# Patient Record
Sex: FEMALE | Race: OTHER | HISPANIC OR LATINO | URBAN - METROPOLITAN AREA
[De-identification: names, ages, dates, MRNs, and addresses within clinical notes are randomized per-mention and may not be internally consistent; named-entity substitution may affect disease eponyms.]

---

## 2017-09-20 ENCOUNTER — EMERGENCY (EMERGENCY)
Facility: HOSPITAL | Age: 27
LOS: 1 days | Discharge: HOME | End: 2017-09-20
Attending: EMERGENCY MEDICINE
Payer: MEDICAID

## 2017-09-20 VITALS
RESPIRATION RATE: 16 BRPM | HEIGHT: 67.32 IN | HEART RATE: 76 BPM | TEMPERATURE: 98 F | WEIGHT: 149.91 LBS | SYSTOLIC BLOOD PRESSURE: 121 MMHG | OXYGEN SATURATION: 100 % | DIASTOLIC BLOOD PRESSURE: 73 MMHG

## 2017-09-20 DIAGNOSIS — G43.909 MIGRAINE, UNSPECIFIED, NOT INTRACTABLE, WITHOUT STATUS MIGRAINOSUS: ICD-10-CM

## 2017-09-20 LAB
ALBUMIN SERPL ELPH-MCNC: 2.9 G/DL — LOW (ref 3.5–5)
ALP SERPL-CCNC: 49 U/L — SIGNIFICANT CHANGE UP (ref 40–120)
ALT FLD-CCNC: 16 U/L DA — SIGNIFICANT CHANGE UP (ref 10–60)
ANION GAP SERPL CALC-SCNC: 5 MMOL/L — SIGNIFICANT CHANGE UP (ref 5–17)
APPEARANCE UR: CLEAR — SIGNIFICANT CHANGE UP
AST SERPL-CCNC: 12 U/L — SIGNIFICANT CHANGE UP (ref 10–40)
BACTERIA # UR AUTO: ABNORMAL /HPF
BASOPHILS # BLD AUTO: 0.1 K/UL — SIGNIFICANT CHANGE UP (ref 0–0.2)
BASOPHILS NFR BLD AUTO: 1.3 % — SIGNIFICANT CHANGE UP (ref 0–2)
BILIRUB SERPL-MCNC: 0.1 MG/DL — LOW (ref 0.2–1.2)
BILIRUB UR-MCNC: NEGATIVE — SIGNIFICANT CHANGE UP
BUN SERPL-MCNC: 6 MG/DL — LOW (ref 7–18)
CALCIUM SERPL-MCNC: 8.5 MG/DL — SIGNIFICANT CHANGE UP (ref 8.4–10.5)
CHLORIDE SERPL-SCNC: 111 MMOL/L — HIGH (ref 96–108)
CO2 SERPL-SCNC: 26 MMOL/L — SIGNIFICANT CHANGE UP (ref 22–31)
COLOR SPEC: YELLOW — SIGNIFICANT CHANGE UP
CREAT SERPL-MCNC: 0.61 MG/DL — SIGNIFICANT CHANGE UP (ref 0.5–1.3)
DIFF PNL FLD: NEGATIVE — SIGNIFICANT CHANGE UP
EOSINOPHIL # BLD AUTO: 0.4 K/UL — SIGNIFICANT CHANGE UP (ref 0–0.5)
EOSINOPHIL NFR BLD AUTO: 6.5 % — HIGH (ref 0–6)
GLUCOSE SERPL-MCNC: 81 MG/DL — SIGNIFICANT CHANGE UP (ref 70–99)
GLUCOSE UR QL: NEGATIVE — SIGNIFICANT CHANGE UP
HCG UR QL: NEGATIVE — SIGNIFICANT CHANGE UP
HCT VFR BLD CALC: 37.5 % — SIGNIFICANT CHANGE UP (ref 34.5–45)
HGB BLD-MCNC: 12.4 G/DL — SIGNIFICANT CHANGE UP (ref 11.5–15.5)
KETONES UR-MCNC: NEGATIVE — SIGNIFICANT CHANGE UP
LEUKOCYTE ESTERASE UR-ACNC: NEGATIVE — SIGNIFICANT CHANGE UP
LYMPHOCYTES # BLD AUTO: 2.9 K/UL — SIGNIFICANT CHANGE UP (ref 1–3.3)
LYMPHOCYTES # BLD AUTO: 41.4 % — SIGNIFICANT CHANGE UP (ref 13–44)
MCHC RBC-ENTMCNC: 32.9 PG — SIGNIFICANT CHANGE UP (ref 27–34)
MCHC RBC-ENTMCNC: 33 GM/DL — SIGNIFICANT CHANGE UP (ref 32–36)
MCV RBC AUTO: 99.6 FL — SIGNIFICANT CHANGE UP (ref 80–100)
MONOCYTES # BLD AUTO: 0.5 K/UL — SIGNIFICANT CHANGE UP (ref 0–0.9)
MONOCYTES NFR BLD AUTO: 7.8 % — SIGNIFICANT CHANGE UP (ref 2–14)
NEUTROPHILS # BLD AUTO: 3 K/UL — SIGNIFICANT CHANGE UP (ref 1.8–7.4)
NEUTROPHILS NFR BLD AUTO: 43 % — SIGNIFICANT CHANGE UP (ref 43–77)
NITRITE UR-MCNC: NEGATIVE — SIGNIFICANT CHANGE UP
PH UR: 8 — SIGNIFICANT CHANGE UP (ref 5–8)
PLATELET # BLD AUTO: 179 K/UL — SIGNIFICANT CHANGE UP (ref 150–400)
POTASSIUM SERPL-MCNC: 3.8 MMOL/L — SIGNIFICANT CHANGE UP (ref 3.5–5.3)
POTASSIUM SERPL-SCNC: 3.8 MMOL/L — SIGNIFICANT CHANGE UP (ref 3.5–5.3)
PROT SERPL-MCNC: 6.8 G/DL — SIGNIFICANT CHANGE UP (ref 6–8.3)
PROT UR-MCNC: NEGATIVE — SIGNIFICANT CHANGE UP
RBC # BLD: 3.76 M/UL — LOW (ref 3.8–5.2)
RBC # FLD: 11.6 % — SIGNIFICANT CHANGE UP (ref 10.3–14.5)
RBC CASTS # UR COMP ASSIST: SIGNIFICANT CHANGE UP /HPF (ref 0–2)
SODIUM SERPL-SCNC: 142 MMOL/L — SIGNIFICANT CHANGE UP (ref 135–145)
SP GR SPEC: 1.01 — SIGNIFICANT CHANGE UP (ref 1.01–1.02)
UROBILINOGEN FLD QL: NEGATIVE — SIGNIFICANT CHANGE UP
WBC # BLD: 6.9 K/UL — SIGNIFICANT CHANGE UP (ref 3.8–10.5)
WBC # FLD AUTO: 6.9 K/UL — SIGNIFICANT CHANGE UP (ref 3.8–10.5)
WBC UR QL: SIGNIFICANT CHANGE UP /HPF (ref 0–5)

## 2017-09-20 PROCEDURE — 81025 URINE PREGNANCY TEST: CPT

## 2017-09-20 PROCEDURE — 99284 EMERGENCY DEPT VISIT MOD MDM: CPT | Mod: 25

## 2017-09-20 PROCEDURE — 85027 COMPLETE CBC AUTOMATED: CPT

## 2017-09-20 PROCEDURE — 96375 TX/PRO/DX INJ NEW DRUG ADDON: CPT

## 2017-09-20 PROCEDURE — 81001 URINALYSIS AUTO W/SCOPE: CPT

## 2017-09-20 PROCEDURE — 80053 COMPREHEN METABOLIC PANEL: CPT

## 2017-09-20 PROCEDURE — 96374 THER/PROPH/DIAG INJ IV PUSH: CPT

## 2017-09-20 PROCEDURE — 99284 EMERGENCY DEPT VISIT MOD MDM: CPT

## 2017-09-20 RX ORDER — SODIUM CHLORIDE 9 MG/ML
1000 INJECTION INTRAMUSCULAR; INTRAVENOUS; SUBCUTANEOUS ONCE
Qty: 0 | Refills: 0 | Status: COMPLETED | OUTPATIENT
Start: 2017-09-20 | End: 2017-09-20

## 2017-09-20 RX ORDER — METOCLOPRAMIDE HCL 10 MG
10 TABLET ORAL ONCE
Qty: 0 | Refills: 0 | Status: COMPLETED | OUTPATIENT
Start: 2017-09-20 | End: 2017-09-20

## 2017-09-20 RX ORDER — ACETAMINOPHEN 500 MG
1000 TABLET ORAL ONCE
Qty: 0 | Refills: 0 | Status: COMPLETED | OUTPATIENT
Start: 2017-09-20 | End: 2017-09-20

## 2017-09-20 RX ADMIN — Medication 400 MILLIGRAM(S): at 17:48

## 2017-09-20 RX ADMIN — Medication 10 MILLIGRAM(S): at 17:40

## 2017-09-20 RX ADMIN — SODIUM CHLORIDE 2000 MILLILITER(S): 9 INJECTION INTRAMUSCULAR; INTRAVENOUS; SUBCUTANEOUS at 17:26

## 2017-09-20 NOTE — ED PROVIDER NOTE - PHYSICAL EXAMINATION
Constitutional: mild distress AAOx3  Eyes: PERRLA EOMI  Head: Normocephalic atraumatic  Mouth: MMM  Cardiac: regular rate   Resp: Lungs CTAB  GI: Abd s/nt/nd  Neuro: CN2-12 intact normal strength sensation and coordination normal gait  Skin: No rashes

## 2017-09-20 NOTE — ED PROVIDER NOTE - PROGRESS NOTE DETAILS
patient feeling better tolerating PO - Will d/c with follow up neuro. Minor Samuel M.D., Attending Physician

## 2017-09-20 NOTE — ED PROVIDER NOTE - NS ED ROS FT
Constitutional: No fever or chills  Eyes: No visual changes  HEENT: No throat pain  CV: No chest pain  Resp: No SOB no cough  GI: No abd pain, + nausea no vomiting  : No dysuria  MSK: No musculoskeletal pain  Skin: No rash  Neuro: + headache

## 2017-09-20 NOTE — ED PROVIDER NOTE - CARE PLAN
Principal Discharge DX:	Headache  Instructions for follow-up, activity and diet:	1. return for worsening symptoms or anything concerning to you  2. take all home meds as prescribed  3. follow up with your pmd call to make an appointment  4. follow up with neuro see atttached sheet.

## 2017-09-20 NOTE — ED PROVIDER NOTE - OBJECTIVE STATEMENT
27F presents to the ED with headache. Pt has a hx of migraines. this episode started 5 days ago. front constant gradually getting worse. associated with scotomas. nausea no vomiting. feels the same as previous headaches she has had. no recent travel skin rash neck pain or fever. no change in strength sensation or coordination. pain now mod-severe. no uri symptoms cp sob abd pain or dysuria. didn't take anything for her pain.

## 2017-09-20 NOTE — ED PROVIDER NOTE - MEDICAL DECISION MAKING DETAILS
27F presents to the ED with headache. Pt has a hx of migraines. this episode started 5 days ago. front constant gradually getting worse. associated with scotomas. nausea no vomiting. feels the same as previous headaches she has had. no recent travel skin rash neck pain or fever. no change in strength sensation or coordination. pain now mod-severe. no uri symptoms cp sob abd pain or dysuria. didn't take anything for her pain. Likely migraine. Will obtain labs for electrolytes, symptoms control urine/hcg and reassess. Minor Samuel M.D., Attending Physician

## 2017-09-20 NOTE — ED PROVIDER NOTE - PLAN OF CARE
1. return for worsening symptoms or anything concerning to you  2. take all home meds as prescribed  3. follow up with your pmd call to make an appointment  4. follow up with neuro see atttached sheet.

## 2017-11-20 ENCOUNTER — EMERGENCY (EMERGENCY)
Facility: HOSPITAL | Age: 27
LOS: 1 days | Discharge: HOME | End: 2017-11-20
Attending: EMERGENCY MEDICINE
Payer: MEDICAID

## 2017-11-20 VITALS
SYSTOLIC BLOOD PRESSURE: 107 MMHG | WEIGHT: 149.91 LBS | DIASTOLIC BLOOD PRESSURE: 73 MMHG | RESPIRATION RATE: 18 BRPM | HEART RATE: 85 BPM | TEMPERATURE: 99 F | OXYGEN SATURATION: 99 % | HEIGHT: 66 IN

## 2017-11-20 VITALS
DIASTOLIC BLOOD PRESSURE: 70 MMHG | TEMPERATURE: 98 F | HEART RATE: 81 BPM | SYSTOLIC BLOOD PRESSURE: 108 MMHG | OXYGEN SATURATION: 100 % | RESPIRATION RATE: 16 BRPM

## 2017-11-20 LAB
APPEARANCE UR: ABNORMAL
BACTERIA # UR AUTO: ABNORMAL /HPF
BILIRUB UR-MCNC: NEGATIVE — SIGNIFICANT CHANGE UP
COLOR SPEC: YELLOW — SIGNIFICANT CHANGE UP
DIFF PNL FLD: ABNORMAL
EPI CELLS # UR: SIGNIFICANT CHANGE UP /HPF
GLUCOSE UR QL: NEGATIVE — SIGNIFICANT CHANGE UP
HCG UR QL: NEGATIVE — SIGNIFICANT CHANGE UP
KETONES UR-MCNC: NEGATIVE — SIGNIFICANT CHANGE UP
LEUKOCYTE ESTERASE UR-ACNC: ABNORMAL
NITRITE UR-MCNC: POSITIVE
PH UR: 6 — SIGNIFICANT CHANGE UP (ref 5–8)
PROT UR-MCNC: 100
RBC CASTS # UR COMP ASSIST: >50 /HPF (ref 0–2)
SP GR SPEC: 1.01 — SIGNIFICANT CHANGE UP (ref 1.01–1.02)
UROBILINOGEN FLD QL: NEGATIVE — SIGNIFICANT CHANGE UP
WBC UR QL: ABNORMAL /HPF (ref 0–5)

## 2017-11-20 PROCEDURE — 99283 EMERGENCY DEPT VISIT LOW MDM: CPT

## 2017-11-20 PROCEDURE — 99284 EMERGENCY DEPT VISIT MOD MDM: CPT

## 2017-11-20 PROCEDURE — 81025 URINE PREGNANCY TEST: CPT

## 2017-11-20 PROCEDURE — 81001 URINALYSIS AUTO W/SCOPE: CPT

## 2017-11-20 RX ORDER — PHENAZOPYRIDINE HCL 100 MG
2 TABLET ORAL
Qty: 12 | Refills: 0 | OUTPATIENT
Start: 2017-11-20 | End: 2017-11-22

## 2017-11-20 RX ORDER — CEFUROXIME AXETIL 250 MG
250 TABLET ORAL ONCE
Qty: 0 | Refills: 0 | Status: COMPLETED | OUTPATIENT
Start: 2017-11-20 | End: 2017-11-20

## 2017-11-20 RX ORDER — CEFUROXIME AXETIL 250 MG
1 TABLET ORAL
Qty: 14 | Refills: 0 | OUTPATIENT
Start: 2017-11-20 | End: 2017-11-27

## 2017-11-20 RX ORDER — PHENAZOPYRIDINE HCL 100 MG
100 TABLET ORAL ONCE
Qty: 0 | Refills: 0 | Status: COMPLETED | OUTPATIENT
Start: 2017-11-20 | End: 2017-11-20

## 2017-11-20 RX ADMIN — Medication 100 MILLIGRAM(S): at 19:41

## 2017-11-20 RX ADMIN — Medication 250 MILLIGRAM(S): at 19:36

## 2017-11-20 NOTE — ED PROVIDER NOTE - OBJECTIVE STATEMENT
26 y/o F pt w/ PMHx of migraines, presents to ED c/o urinary frequency, burning w/ urination, and hematuria x last night after taking Plan-B one step. Pt denies fever, chills, or any other complaints. Pt is allergic to Ibuprofen (vomiting).

## 2017-11-20 NOTE — ED ADULT NURSE NOTE - OBJECTIVE STATEMENT
Patient came to the ED a/o x 3 ambulates c/o burning in urination, with hematuria x 2 days. . Patient states she took "plan B" medication and has been having symptoms since.

## 2017-11-24 DIAGNOSIS — Z88.6 ALLERGY STATUS TO ANALGESIC AGENT: ICD-10-CM

## 2017-11-24 DIAGNOSIS — N39.0 URINARY TRACT INFECTION, SITE NOT SPECIFIED: ICD-10-CM

## 2017-11-24 DIAGNOSIS — R30.0 DYSURIA: ICD-10-CM

## 2018-07-07 ENCOUNTER — EMERGENCY (EMERGENCY)
Facility: HOSPITAL | Age: 28
LOS: 1 days | Discharge: HOME | End: 2018-07-07
Attending: EMERGENCY MEDICINE
Payer: MEDICAID

## 2018-07-07 VITALS — HEIGHT: 68.5 IN | WEIGHT: 167.55 LBS

## 2018-07-07 LAB
ALBUMIN SERPL ELPH-MCNC: 3.1 G/DL — LOW (ref 3.5–5)
ALP SERPL-CCNC: 50 U/L — SIGNIFICANT CHANGE UP (ref 40–120)
ALT FLD-CCNC: 19 U/L DA — SIGNIFICANT CHANGE UP (ref 10–60)
ANION GAP SERPL CALC-SCNC: 6 MMOL/L — SIGNIFICANT CHANGE UP (ref 5–17)
APPEARANCE UR: CLEAR — SIGNIFICANT CHANGE UP
AST SERPL-CCNC: 16 U/L — SIGNIFICANT CHANGE UP (ref 10–40)
BASOPHILS # BLD AUTO: 0.1 K/UL — SIGNIFICANT CHANGE UP (ref 0–0.2)
BASOPHILS NFR BLD AUTO: 0.8 % — SIGNIFICANT CHANGE UP (ref 0–2)
BILIRUB SERPL-MCNC: 0.2 MG/DL — SIGNIFICANT CHANGE UP (ref 0.2–1.2)
BILIRUB UR-MCNC: NEGATIVE — SIGNIFICANT CHANGE UP
BUN SERPL-MCNC: 10 MG/DL — SIGNIFICANT CHANGE UP (ref 7–18)
CALCIUM SERPL-MCNC: 9.1 MG/DL — SIGNIFICANT CHANGE UP (ref 8.4–10.5)
CHLORIDE SERPL-SCNC: 107 MMOL/L — SIGNIFICANT CHANGE UP (ref 96–108)
CO2 SERPL-SCNC: 28 MMOL/L — SIGNIFICANT CHANGE UP (ref 22–31)
COLOR SPEC: YELLOW — SIGNIFICANT CHANGE UP
CREAT SERPL-MCNC: 0.72 MG/DL — SIGNIFICANT CHANGE UP (ref 0.5–1.3)
DIFF PNL FLD: ABNORMAL
EOSINOPHIL # BLD AUTO: 0.2 K/UL — SIGNIFICANT CHANGE UP (ref 0–0.5)
EOSINOPHIL NFR BLD AUTO: 1.9 % — SIGNIFICANT CHANGE UP (ref 0–6)
GLUCOSE SERPL-MCNC: 91 MG/DL — SIGNIFICANT CHANGE UP (ref 70–99)
GLUCOSE UR QL: NEGATIVE — SIGNIFICANT CHANGE UP
HCG UR QL: NEGATIVE — SIGNIFICANT CHANGE UP
HCT VFR BLD CALC: 37.8 % — SIGNIFICANT CHANGE UP (ref 34.5–45)
HGB BLD-MCNC: 12.4 G/DL — SIGNIFICANT CHANGE UP (ref 11.5–15.5)
KETONES UR-MCNC: NEGATIVE — SIGNIFICANT CHANGE UP
LEUKOCYTE ESTERASE UR-ACNC: ABNORMAL
LYMPHOCYTES # BLD AUTO: 2.4 K/UL — SIGNIFICANT CHANGE UP (ref 1–3.3)
LYMPHOCYTES # BLD AUTO: 22 % — SIGNIFICANT CHANGE UP (ref 13–44)
MCHC RBC-ENTMCNC: 31.7 PG — SIGNIFICANT CHANGE UP (ref 27–34)
MCHC RBC-ENTMCNC: 32.7 GM/DL — SIGNIFICANT CHANGE UP (ref 32–36)
MCV RBC AUTO: 96.8 FL — SIGNIFICANT CHANGE UP (ref 80–100)
MONOCYTES # BLD AUTO: 0.7 K/UL — SIGNIFICANT CHANGE UP (ref 0–0.9)
MONOCYTES NFR BLD AUTO: 6.3 % — SIGNIFICANT CHANGE UP (ref 2–14)
NEUTROPHILS # BLD AUTO: 7.5 K/UL — HIGH (ref 1.8–7.4)
NEUTROPHILS NFR BLD AUTO: 69.2 % — SIGNIFICANT CHANGE UP (ref 43–77)
NITRITE UR-MCNC: NEGATIVE — SIGNIFICANT CHANGE UP
PH UR: 6.5 — SIGNIFICANT CHANGE UP (ref 5–8)
PLATELET # BLD AUTO: 206 K/UL — SIGNIFICANT CHANGE UP (ref 150–400)
POTASSIUM SERPL-MCNC: 4.4 MMOL/L — SIGNIFICANT CHANGE UP (ref 3.5–5.3)
POTASSIUM SERPL-SCNC: 4.4 MMOL/L — SIGNIFICANT CHANGE UP (ref 3.5–5.3)
PROT SERPL-MCNC: 7.5 G/DL — SIGNIFICANT CHANGE UP (ref 6–8.3)
PROT UR-MCNC: NEGATIVE — SIGNIFICANT CHANGE UP
RBC # BLD: 3.91 M/UL — SIGNIFICANT CHANGE UP (ref 3.8–5.2)
RBC # FLD: 11.5 % — SIGNIFICANT CHANGE UP (ref 10.3–14.5)
SODIUM SERPL-SCNC: 141 MMOL/L — SIGNIFICANT CHANGE UP (ref 135–145)
SP GR SPEC: 1 — LOW (ref 1.01–1.02)
UROBILINOGEN FLD QL: NEGATIVE — SIGNIFICANT CHANGE UP
WBC # BLD: 10.9 K/UL — HIGH (ref 3.8–10.5)
WBC # FLD AUTO: 10.9 K/UL — HIGH (ref 3.8–10.5)

## 2018-07-07 PROCEDURE — 74177 CT ABD & PELVIS W/CONTRAST: CPT | Mod: 26

## 2018-07-07 PROCEDURE — 99285 EMERGENCY DEPT VISIT HI MDM: CPT

## 2018-07-07 RX ORDER — CEFTRIAXONE 500 MG/1
1 INJECTION, POWDER, FOR SOLUTION INTRAMUSCULAR; INTRAVENOUS ONCE
Qty: 0 | Refills: 0 | Status: COMPLETED | OUTPATIENT
Start: 2018-07-07 | End: 2018-07-07

## 2018-07-07 RX ORDER — SODIUM CHLORIDE 9 MG/ML
1000 INJECTION INTRAMUSCULAR; INTRAVENOUS; SUBCUTANEOUS ONCE
Qty: 0 | Refills: 0 | Status: COMPLETED | OUTPATIENT
Start: 2018-07-07 | End: 2018-07-07

## 2018-07-07 RX ORDER — CEFUROXIME AXETIL 250 MG
1 TABLET ORAL
Qty: 10 | Refills: 0 | OUTPATIENT
Start: 2018-07-07 | End: 2018-07-11

## 2018-07-07 RX ORDER — PHENAZOPYRIDINE HCL 100 MG
1 TABLET ORAL
Qty: 6 | Refills: 0 | OUTPATIENT
Start: 2018-07-07 | End: 2018-07-08

## 2018-07-07 RX ADMIN — SODIUM CHLORIDE 1000 MILLILITER(S): 9 INJECTION INTRAMUSCULAR; INTRAVENOUS; SUBCUTANEOUS at 19:58

## 2018-07-07 NOTE — ED PROVIDER NOTE - MEDICAL DECISION MAKING DETAILS
27 y/o F pt presents with lower abdominal pain and burning with urination. Will check labs, will check urine, and will reassess.

## 2018-07-07 NOTE — ED PROVIDER NOTE - OBJECTIVE STATEMENT
27 y/o F pt with PMHx of Migraines (on medication) and no significant PSHx presents with mother to ED c/o worsening lower abdominal pain with associated burning with urination x2 days. Pt additionally reports associated occasional urinary retention. Pt describes lower abdominal pain as pinching in nature. Pt denies fever, chills, nausea, vomiting, diarrhea, or any other complaints. Per pt's mother, pt recently had a dental infection, which was treated with Abx's BIB x20 days prescribed by pt's dentist. Allergies: Ibuprofen (vomiting).

## 2018-07-08 VITALS
OXYGEN SATURATION: 100 % | HEART RATE: 72 BPM | RESPIRATION RATE: 18 BRPM | SYSTOLIC BLOOD PRESSURE: 104 MMHG | DIASTOLIC BLOOD PRESSURE: 69 MMHG | TEMPERATURE: 98 F

## 2018-07-08 PROCEDURE — 74177 CT ABD & PELVIS W/CONTRAST: CPT

## 2018-07-08 PROCEDURE — 96374 THER/PROPH/DIAG INJ IV PUSH: CPT | Mod: XU

## 2018-07-08 PROCEDURE — 81001 URINALYSIS AUTO W/SCOPE: CPT

## 2018-07-08 PROCEDURE — 80053 COMPREHEN METABOLIC PANEL: CPT

## 2018-07-08 PROCEDURE — 85027 COMPLETE CBC AUTOMATED: CPT

## 2018-07-08 PROCEDURE — 87186 SC STD MICRODIL/AGAR DIL: CPT

## 2018-07-08 PROCEDURE — 87086 URINE CULTURE/COLONY COUNT: CPT

## 2018-07-08 PROCEDURE — 99284 EMERGENCY DEPT VISIT MOD MDM: CPT | Mod: 25

## 2018-07-08 PROCEDURE — 81025 URINE PREGNANCY TEST: CPT

## 2018-07-08 RX ADMIN — CEFTRIAXONE 100 GRAM(S): 500 INJECTION, POWDER, FOR SOLUTION INTRAMUSCULAR; INTRAVENOUS at 00:14

## 2019-03-04 NOTE — ED ADULT TRIAGE NOTE - MEANS OF ARRIVAL
[facility] Accession Number: [id number]

.                                                                01

Material submitted:                                        .

PART A: LEFT THYROID TISSUE R/O LEFT PARATHYROID - FS

PART B: LEFT THYROID AND ISTHMUS

PART C: RIGHT THYROID LOBE

.                                                                01

Clinical history:                                          .

Multi [place]

.                                                                02

**********************************************************************

Diagnosis:

A. Left thyroid tissue rule out parathyroid:

- Parathyroid tissue identified.

.

B. Thyroid lobe and isthmus, left thyroid lobectomy and isthmusectomy:

- Adenomatous nodules, multiple, the largest measuring 4.0 cm, showing

focal sclerosis and dystrophic calcification.

- Single left perithyroidal lymph node negative for tumor.

.

C. Thyroid lobe, right thyroid lobectomy:

- Adenomatous nodules, multiple, the largest measuring 1.3 cm in greatest

dimension.

- Single right perithyroidal lymph node negative for tumor.

- No parathyroid glands identified.

LBQ/03/04/2019

**********************************************************************

.                                                                02

Comment:

There is no evidence of malignancy.

(JPM/db; 3/04/2019)

.                                                                02

Electronically signed:                                     .

Fab Harmon MD, Pathologist

NPI- [id number]

.                                                                01

Gross description:                                         .

A.  Received fresh for intraoperative frozen section consultation,

labeled, "Elisabeth Molina - Left thyroid tissue, rule out left parathyroid",

is a small segment of yellow and reddish-brown focally cauterized soft

tissue measuring up to 0.5 cm in greatest dimension. This is submitted for

frozen section without sectioning as FSA1. The tissue remaining from

frozen section is submitted for permanent sections as A1.

(JPM:mml; 02/28/2019)

.

B. The specimen is received in formalin, labeled "TracyElisabeth, thyroid and

isthmus left" and consists of a 48 g enlarged left lobe of thyroid (6.2 x

5.0 x 3.5 cm) and isthmus (3.2 x 2.3 cm).  The capsule is purple-tan and

disrupted with friable nodule protruding from the disruption.  The

specimen is inked black with the disruption inked orange.  It is sectioned

S-I to reveal the lobe has been almost entirely replaced by multiple tan,

friable to solid colloid nodules with focal calcifications.  The nodules

occupy over 95% of the specimen with the less than 5% consisting of soft

brown-red parenchyma at the isthmus.  The nodules range from 0.1 cm to 4.0

cm.  Representative sections are submitted from S-I in B1-B6 with

disruption/calcification in B3 following decalcification.

.

C. The specimen is received in formalin, labeled "Elisabeth Molina, right

thyroid lobe" and consists of a 12 g right lobe of thyroid measuring 5.1 x

2.6 x 2.3 cm.  The capsule is intact with thin adhesions.  It is inked

black and sectioned S-I to reveal multiple pink-tan colloid nodules

measuring between 0.1 cm and 1.3 cm which occupy approximately 60% of the

parenchyma.  The uninvolved parenchyma is soft brown-red.  Representative

sections are from S-I submitted in C1-C5.

(SDY; 3/2/2019)

.

.

INTRAOPERATIVE FROZEN SECTION CONSULTATION:

(Dr. Fab Harmon)

.

FSA1.  Left thyroid tissue, rule out left parathyroid:

- Parathyroid tissue identified.

.

The results are reported to Dr. Cuenca in the operating room.

(JPM:mml; 02/28/2019)

.

.

Frozen section performed at [facility], [address] [place] [id number].

SYU/QLM

.                                                                02

Pathologist provided ICD-10:

E04.1, E07.9

.                                                                02

CPT                                                        .

920207, 941373, [id number]

Specimen Comment: A courtesy copy of this report has been sent to

Specimen Comment: [phone].

Specimen Comment: Report sent to  / DR PATEL

***Performed at:  01

   [address], [place], [place]  [id number]

   MD Clarence Hicks MD Phone:  [phone]

***Performed at:  02

   [facility]

   [address] [place], [place]  [id number]

   MD Fab Harmon MD Phone:  [phone]
ambulatory

## 2020-07-15 ENCOUNTER — EMERGENCY (EMERGENCY)
Facility: HOSPITAL | Age: 30
LOS: 1 days | Discharge: HOME | End: 2020-07-15
Attending: STUDENT IN AN ORGANIZED HEALTH CARE EDUCATION/TRAINING PROGRAM
Payer: MEDICAID

## 2020-07-15 VITALS
RESPIRATION RATE: 16 BRPM | OXYGEN SATURATION: 100 % | TEMPERATURE: 98 F | HEIGHT: 67 IN | WEIGHT: 154.1 LBS | HEART RATE: 82 BPM | DIASTOLIC BLOOD PRESSURE: 76 MMHG | SYSTOLIC BLOOD PRESSURE: 116 MMHG

## 2020-07-15 LAB
ALBUMIN SERPL ELPH-MCNC: 3.5 G/DL — SIGNIFICANT CHANGE UP (ref 3.5–5)
ALP SERPL-CCNC: 59 U/L — SIGNIFICANT CHANGE UP (ref 40–120)
ALT FLD-CCNC: 14 U/L DA — SIGNIFICANT CHANGE UP (ref 10–60)
ANION GAP SERPL CALC-SCNC: 4 MMOL/L — LOW (ref 5–17)
APPEARANCE UR: CLEAR — SIGNIFICANT CHANGE UP
AST SERPL-CCNC: 10 U/L — SIGNIFICANT CHANGE UP (ref 10–40)
BACTERIA # UR AUTO: ABNORMAL /HPF
BASOPHILS # BLD AUTO: 0.08 K/UL — SIGNIFICANT CHANGE UP (ref 0–0.2)
BASOPHILS NFR BLD AUTO: 0.8 % — SIGNIFICANT CHANGE UP (ref 0–2)
BILIRUB SERPL-MCNC: 0.1 MG/DL — LOW (ref 0.2–1.2)
BILIRUB UR-MCNC: NEGATIVE — SIGNIFICANT CHANGE UP
BUN SERPL-MCNC: 11 MG/DL — SIGNIFICANT CHANGE UP (ref 7–18)
CALCIUM SERPL-MCNC: 8.9 MG/DL — SIGNIFICANT CHANGE UP (ref 8.4–10.5)
CHLORIDE SERPL-SCNC: 108 MMOL/L — SIGNIFICANT CHANGE UP (ref 96–108)
CO2 SERPL-SCNC: 26 MMOL/L — SIGNIFICANT CHANGE UP (ref 22–31)
COLOR SPEC: YELLOW — SIGNIFICANT CHANGE UP
CREAT SERPL-MCNC: 0.89 MG/DL — SIGNIFICANT CHANGE UP (ref 0.5–1.3)
DIFF PNL FLD: ABNORMAL
EOSINOPHIL # BLD AUTO: 0.48 K/UL — SIGNIFICANT CHANGE UP (ref 0–0.5)
EOSINOPHIL NFR BLD AUTO: 5 % — SIGNIFICANT CHANGE UP (ref 0–6)
EPI CELLS # UR: ABNORMAL /HPF
GLUCOSE SERPL-MCNC: 81 MG/DL — SIGNIFICANT CHANGE UP (ref 70–99)
GLUCOSE UR QL: NEGATIVE — SIGNIFICANT CHANGE UP
HCG UR QL: NEGATIVE — SIGNIFICANT CHANGE UP
HCT VFR BLD CALC: 38.2 % — SIGNIFICANT CHANGE UP (ref 34.5–45)
HGB BLD-MCNC: 12.7 G/DL — SIGNIFICANT CHANGE UP (ref 11.5–15.5)
IMM GRANULOCYTES NFR BLD AUTO: 0.1 % — SIGNIFICANT CHANGE UP (ref 0–1.5)
KETONES UR-MCNC: ABNORMAL
LEUKOCYTE ESTERASE UR-ACNC: NEGATIVE — SIGNIFICANT CHANGE UP
LYMPHOCYTES # BLD AUTO: 3.21 K/UL — SIGNIFICANT CHANGE UP (ref 1–3.3)
LYMPHOCYTES # BLD AUTO: 33.8 % — SIGNIFICANT CHANGE UP (ref 13–44)
MCHC RBC-ENTMCNC: 31.4 PG — SIGNIFICANT CHANGE UP (ref 27–34)
MCHC RBC-ENTMCNC: 33.2 GM/DL — SIGNIFICANT CHANGE UP (ref 32–36)
MCV RBC AUTO: 94.3 FL — SIGNIFICANT CHANGE UP (ref 80–100)
MONOCYTES # BLD AUTO: 0.5 K/UL — SIGNIFICANT CHANGE UP (ref 0–0.9)
MONOCYTES NFR BLD AUTO: 5.3 % — SIGNIFICANT CHANGE UP (ref 2–14)
NEUTROPHILS # BLD AUTO: 5.23 K/UL — SIGNIFICANT CHANGE UP (ref 1.8–7.4)
NEUTROPHILS NFR BLD AUTO: 55 % — SIGNIFICANT CHANGE UP (ref 43–77)
NITRITE UR-MCNC: NEGATIVE — SIGNIFICANT CHANGE UP
NRBC # BLD: 0 /100 WBCS — SIGNIFICANT CHANGE UP (ref 0–0)
PH UR: 5 — SIGNIFICANT CHANGE UP (ref 5–8)
PLATELET # BLD AUTO: 291 K/UL — SIGNIFICANT CHANGE UP (ref 150–400)
POTASSIUM SERPL-MCNC: 3.6 MMOL/L — SIGNIFICANT CHANGE UP (ref 3.5–5.3)
POTASSIUM SERPL-SCNC: 3.6 MMOL/L — SIGNIFICANT CHANGE UP (ref 3.5–5.3)
PROT SERPL-MCNC: 7.8 G/DL — SIGNIFICANT CHANGE UP (ref 6–8.3)
PROT UR-MCNC: NEGATIVE — SIGNIFICANT CHANGE UP
RBC # BLD: 4.05 M/UL — SIGNIFICANT CHANGE UP (ref 3.8–5.2)
RBC # FLD: 11.8 % — SIGNIFICANT CHANGE UP (ref 10.3–14.5)
RBC CASTS # UR COMP ASSIST: ABNORMAL /HPF (ref 0–2)
SODIUM SERPL-SCNC: 138 MMOL/L — SIGNIFICANT CHANGE UP (ref 135–145)
SP GR SPEC: 1.02 — SIGNIFICANT CHANGE UP (ref 1.01–1.02)
UROBILINOGEN FLD QL: NEGATIVE — SIGNIFICANT CHANGE UP
WBC # BLD: 9.51 K/UL — SIGNIFICANT CHANGE UP (ref 3.8–10.5)
WBC # FLD AUTO: 9.51 K/UL — SIGNIFICANT CHANGE UP (ref 3.8–10.5)
WBC UR QL: SIGNIFICANT CHANGE UP /HPF (ref 0–5)

## 2020-07-15 PROCEDURE — 81001 URINALYSIS AUTO W/SCOPE: CPT

## 2020-07-15 PROCEDURE — 99284 EMERGENCY DEPT VISIT MOD MDM: CPT

## 2020-07-15 PROCEDURE — 96374 THER/PROPH/DIAG INJ IV PUSH: CPT

## 2020-07-15 PROCEDURE — 81025 URINE PREGNANCY TEST: CPT

## 2020-07-15 PROCEDURE — 36415 COLL VENOUS BLD VENIPUNCTURE: CPT

## 2020-07-15 PROCEDURE — 80053 COMPREHEN METABOLIC PANEL: CPT

## 2020-07-15 PROCEDURE — 85027 COMPLETE CBC AUTOMATED: CPT

## 2020-07-15 PROCEDURE — 99284 EMERGENCY DEPT VISIT MOD MDM: CPT | Mod: 25

## 2020-07-15 RX ORDER — KETOROLAC TROMETHAMINE 30 MG/ML
15 SYRINGE (ML) INJECTION ONCE
Refills: 0 | Status: DISCONTINUED | OUTPATIENT
Start: 2020-07-15 | End: 2020-07-15

## 2020-07-15 RX ORDER — LIDOCAINE 4 G/100G
2 CREAM TOPICAL ONCE
Refills: 0 | Status: COMPLETED | OUTPATIENT
Start: 2020-07-15 | End: 2020-07-15

## 2020-07-15 RX ORDER — SODIUM CHLORIDE 9 MG/ML
1000 INJECTION INTRAMUSCULAR; INTRAVENOUS; SUBCUTANEOUS ONCE
Refills: 0 | Status: COMPLETED | OUTPATIENT
Start: 2020-07-15 | End: 2020-07-15

## 2020-07-15 RX ADMIN — Medication 15 MILLIGRAM(S): at 20:35

## 2020-07-15 RX ADMIN — SODIUM CHLORIDE 1000 MILLILITER(S): 9 INJECTION INTRAMUSCULAR; INTRAVENOUS; SUBCUTANEOUS at 19:46

## 2020-07-15 RX ADMIN — LIDOCAINE 2 PATCH: 4 CREAM TOPICAL at 19:47

## 2020-07-15 NOTE — ED PROVIDER NOTE - CONTEXT
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

## 2020-07-15 NOTE — ED PROVIDER NOTE - PATIENT PORTAL LINK FT
You can access the FollowMyHealth Patient Portal offered by [facility] by registering at the following website: [email or web address]. By joining [email or web address]’s FollowMyHealth portal, you will also be able to view your health information using other applications (apps) compatible with our system.

## 2020-07-15 NOTE — ED PROVIDER NOTE - PROGRESS NOTE DETAILS
Patient reports feeling better with meds.  Patient nontoxic and medically stable for discharge. Results  discussed with patient. Return precautions provided and patient understands to return to the ED for concerning or worsening signs and symptoms. Instructed to follow up with primary care physician and agreeable. Patient's questions answered.

## 2020-07-15 NOTE — ED PROVIDER NOTE - CLINICAL SUMMARY MEDICAL DECISION MAKING FREE TEXT BOX
Patient presenting with back pain and neck pain x4 days and nausea and vomiting 2 days ago. Will order labs, give fluids and medication, and reassess.

## 2020-07-15 NOTE — ED ADULT NURSE NOTE - OBJECTIVE STATEMENT
Pt. c/o neck pain and back that started 3 days ago. 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 Pt. stated she had nausea and vomiting a day ago and today took Tylenol codeine with little effect.

## 2020-07-15 NOTE — ED PROVIDER NOTE - OBJECTIVE STATEMENT
30 year old female with PMHx of depression presenting to the ED with complaints of upper back pain and neck pain since 3 days ago. 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 Patient denies any specific trauma. Patient also endorses that two days ago she had nausea and vomiting in  the morning which resolved throughout the day. She states that she took two pills of Codeine this morning and this afternoon with minimal relief. Patient denies headache, abdominal pain, diarrhea, constipation, fever, chills, cough, or any other acute complaints. LMP 2 weeks ago.

## 2020-07-15 NOTE — ED PROVIDER NOTE - ATTENDING CONTRIBUTION TO CARE
patient presenting with upper back and neck pain  5/5 str in b/l extremities  ambulatrory  neuro intact  no midline deformity  appears msk in nature, will obtain lab, treat pain and reassess

## 2020-07-17 PROBLEM — G43.909 MIGRAINE, UNSPECIFIED, NOT INTRACTABLE, WITHOUT STATUS MIGRAINOSUS: Chronic | Status: ACTIVE | Noted: 2018-07-07

## 2020-08-21 NOTE — ED ADULT NURSE NOTE - NS ED NOTE ABUSE SUSPICION NEGLECT YN
Post-Care Instructions: I reviewed with the patient in detail post-care instructions. Patient is to wear sunprotection, and avoid picking at any of the treated lesions. Pt may apply Vaseline to crusted or scabbing areas. Number Of Freeze-Thaw Cycles: 3 freeze-thaw cycles Duration Of Freeze Thaw-Cycle (Seconds): 2 Render Post-Care Instructions In Note?: yes Render Note In Bullet Format When Appropriate: No Detail Level: Detailed Consent: The patient's consent was obtained including but not limited to risks of crusting, scabbing, blistering, scarring, darker or lighter pigmentary change, recurrence, incomplete removal and infection. No

## 2021-04-21 NOTE — ED ADULT TRIAGE NOTE - MODE OF ARRIVAL
Follow up with orthopedics in the next day or two  Go to emergency department if increasing pain, increasing swelling, fever or other change in symptoms.      
Public Transport

## 2022-02-14 NOTE — ED ADULT NURSE NOTE - NS_NURSE_DISC_ED_ALL_ED_PROVIDEDBY
Abdomen , soft, nontender, nondistended , no guarding or rigidity , no masses palpable , normal bowel sounds , Liver and Spleen , no hepatomegaly present , no hepatosplenomegaly , liver nontender , spleen not palpable ACP

## 2022-03-10 ENCOUNTER — EMERGENCY (EMERGENCY)
Facility: HOSPITAL | Age: 32
LOS: 1 days | Discharge: HOME | End: 2022-03-10
Attending: STUDENT IN AN ORGANIZED HEALTH CARE EDUCATION/TRAINING PROGRAM
Payer: MEDICAID

## 2022-03-10 VITALS
TEMPERATURE: 98 F | HEIGHT: 67 IN | HEART RATE: 88 BPM | RESPIRATION RATE: 18 BRPM | WEIGHT: 171.96 LBS | SYSTOLIC BLOOD PRESSURE: 118 MMHG | DIASTOLIC BLOOD PRESSURE: 73 MMHG | OXYGEN SATURATION: 100 %

## 2022-03-10 DIAGNOSIS — Z34.90 ENCOUNTER FOR SUPERVISION OF NORMAL PREGNANCY, UNSPECIFIED, UNSPECIFIED TRIMESTER: ICD-10-CM

## 2022-03-10 DIAGNOSIS — N83.201 UNSPECIFIED OVARIAN CYST, RIGHT SIDE: ICD-10-CM

## 2022-03-10 PROBLEM — F32.9 MAJOR DEPRESSIVE DISORDER, SINGLE EPISODE, UNSPECIFIED: Chronic | Status: ACTIVE | Noted: 2020-07-15

## 2022-03-10 LAB
ALBUMIN SERPL ELPH-MCNC: 3.7 G/DL — SIGNIFICANT CHANGE UP (ref 3.5–5)
ALP SERPL-CCNC: 44 U/L — SIGNIFICANT CHANGE UP (ref 40–120)
ALT FLD-CCNC: 19 U/L DA — SIGNIFICANT CHANGE UP (ref 10–60)
ANION GAP SERPL CALC-SCNC: 5 MMOL/L — SIGNIFICANT CHANGE UP (ref 5–17)
APPEARANCE UR: CLEAR — SIGNIFICANT CHANGE UP
AST SERPL-CCNC: 14 U/L — SIGNIFICANT CHANGE UP (ref 10–40)
BACTERIA # UR AUTO: ABNORMAL /HPF
BASOPHILS # BLD AUTO: 0.1 K/UL — SIGNIFICANT CHANGE UP (ref 0–0.2)
BASOPHILS NFR BLD AUTO: 1 % — SIGNIFICANT CHANGE UP (ref 0–2)
BILIRUB SERPL-MCNC: 0.3 MG/DL — SIGNIFICANT CHANGE UP (ref 0.2–1.2)
BILIRUB UR-MCNC: NEGATIVE — SIGNIFICANT CHANGE UP
BLD GP AB SCN SERPL QL: SIGNIFICANT CHANGE UP
BUN SERPL-MCNC: 11 MG/DL — SIGNIFICANT CHANGE UP (ref 7–18)
CALCIUM SERPL-MCNC: 9.3 MG/DL — SIGNIFICANT CHANGE UP (ref 8.4–10.5)
CHLORIDE SERPL-SCNC: 106 MMOL/L — SIGNIFICANT CHANGE UP (ref 96–108)
CO2 SERPL-SCNC: 25 MMOL/L — SIGNIFICANT CHANGE UP (ref 22–31)
COLOR SPEC: YELLOW — SIGNIFICANT CHANGE UP
CREAT SERPL-MCNC: 0.63 MG/DL — SIGNIFICANT CHANGE UP (ref 0.5–1.3)
DIFF PNL FLD: ABNORMAL
EGFR: 120 ML/MIN/1.73M2 — SIGNIFICANT CHANGE UP
EOSINOPHIL # BLD AUTO: 0.23 K/UL — SIGNIFICANT CHANGE UP (ref 0–0.5)
EOSINOPHIL NFR BLD AUTO: 2.4 % — SIGNIFICANT CHANGE UP (ref 0–6)
EPI CELLS # UR: ABNORMAL /HPF
GLUCOSE SERPL-MCNC: 86 MG/DL — SIGNIFICANT CHANGE UP (ref 70–99)
GLUCOSE UR QL: NEGATIVE — SIGNIFICANT CHANGE UP
HCG SERPL-ACNC: 88 MIU/ML — HIGH
HCT VFR BLD CALC: 38.9 % — SIGNIFICANT CHANGE UP (ref 34.5–45)
HGB BLD-MCNC: 13 G/DL — SIGNIFICANT CHANGE UP (ref 11.5–15.5)
IMM GRANULOCYTES NFR BLD AUTO: 0.2 % — SIGNIFICANT CHANGE UP (ref 0–1.5)
KETONES UR-MCNC: NEGATIVE — SIGNIFICANT CHANGE UP
LEUKOCYTE ESTERASE UR-ACNC: NEGATIVE — SIGNIFICANT CHANGE UP
LYMPHOCYTES # BLD AUTO: 2.97 K/UL — SIGNIFICANT CHANGE UP (ref 1–3.3)
LYMPHOCYTES # BLD AUTO: 30.6 % — SIGNIFICANT CHANGE UP (ref 13–44)
MCHC RBC-ENTMCNC: 31.9 PG — SIGNIFICANT CHANGE UP (ref 27–34)
MCHC RBC-ENTMCNC: 33.4 GM/DL — SIGNIFICANT CHANGE UP (ref 32–36)
MCV RBC AUTO: 95.3 FL — SIGNIFICANT CHANGE UP (ref 80–100)
MONOCYTES # BLD AUTO: 0.7 K/UL — SIGNIFICANT CHANGE UP (ref 0–0.9)
MONOCYTES NFR BLD AUTO: 7.2 % — SIGNIFICANT CHANGE UP (ref 2–14)
NEUTROPHILS # BLD AUTO: 5.7 K/UL — SIGNIFICANT CHANGE UP (ref 1.8–7.4)
NEUTROPHILS NFR BLD AUTO: 58.6 % — SIGNIFICANT CHANGE UP (ref 43–77)
NITRITE UR-MCNC: NEGATIVE — SIGNIFICANT CHANGE UP
NRBC # BLD: 0 /100 WBCS — SIGNIFICANT CHANGE UP (ref 0–0)
PH UR: 6 — SIGNIFICANT CHANGE UP (ref 5–8)
PLATELET # BLD AUTO: 285 K/UL — SIGNIFICANT CHANGE UP (ref 150–400)
POTASSIUM SERPL-MCNC: 3.8 MMOL/L — SIGNIFICANT CHANGE UP (ref 3.5–5.3)
POTASSIUM SERPL-SCNC: 3.8 MMOL/L — SIGNIFICANT CHANGE UP (ref 3.5–5.3)
PROT SERPL-MCNC: 7.8 G/DL — SIGNIFICANT CHANGE UP (ref 6–8.3)
PROT UR-MCNC: NEGATIVE — SIGNIFICANT CHANGE UP
RBC # BLD: 4.08 M/UL — SIGNIFICANT CHANGE UP (ref 3.8–5.2)
RBC # FLD: 12.3 % — SIGNIFICANT CHANGE UP (ref 10.3–14.5)
RBC CASTS # UR COMP ASSIST: SIGNIFICANT CHANGE UP /HPF (ref 0–2)
SODIUM SERPL-SCNC: 136 MMOL/L — SIGNIFICANT CHANGE UP (ref 135–145)
SP GR SPEC: 1.01 — SIGNIFICANT CHANGE UP (ref 1.01–1.02)
UROBILINOGEN FLD QL: NEGATIVE — SIGNIFICANT CHANGE UP
WBC # BLD: 9.72 K/UL — SIGNIFICANT CHANGE UP (ref 3.8–10.5)
WBC # FLD AUTO: 9.72 K/UL — SIGNIFICANT CHANGE UP (ref 3.8–10.5)
WBC UR QL: SIGNIFICANT CHANGE UP /HPF (ref 0–5)

## 2022-03-10 PROCEDURE — 86900 BLOOD TYPING SEROLOGIC ABO: CPT

## 2022-03-10 PROCEDURE — 86850 RBC ANTIBODY SCREEN: CPT

## 2022-03-10 PROCEDURE — 99285 EMERGENCY DEPT VISIT HI MDM: CPT

## 2022-03-10 PROCEDURE — 87086 URINE CULTURE/COLONY COUNT: CPT

## 2022-03-10 PROCEDURE — 86901 BLOOD TYPING SEROLOGIC RH(D): CPT

## 2022-03-10 PROCEDURE — 84702 CHORIONIC GONADOTROPIN TEST: CPT

## 2022-03-10 PROCEDURE — 36415 COLL VENOUS BLD VENIPUNCTURE: CPT

## 2022-03-10 PROCEDURE — 81001 URINALYSIS AUTO W/SCOPE: CPT

## 2022-03-10 PROCEDURE — 80053 COMPREHEN METABOLIC PANEL: CPT

## 2022-03-10 PROCEDURE — 76801 OB US < 14 WKS SINGLE FETUS: CPT | Mod: 26

## 2022-03-10 PROCEDURE — 76817 TRANSVAGINAL US OBSTETRIC: CPT | Mod: 26

## 2022-03-10 PROCEDURE — 76817 TRANSVAGINAL US OBSTETRIC: CPT

## 2022-03-10 PROCEDURE — 76801 OB US < 14 WKS SINGLE FETUS: CPT

## 2022-03-10 PROCEDURE — 85025 COMPLETE CBC W/AUTO DIFF WBC: CPT

## 2022-03-10 PROCEDURE — 99284 EMERGENCY DEPT VISIT MOD MDM: CPT | Mod: 25

## 2022-03-10 NOTE — ED PROVIDER NOTE - CLINICAL SUMMARY MEDICAL DECISION MAKING FREE TEXT BOX
31 year old female who is 5 weeks, 3 days pregnant by LMP was referred here by the OB, Dr. Singh, for further evaluation. No signs of acute abdomen. Plan is labs, urine, ultrasound, and reassess.

## 2022-03-10 NOTE — CONSULT NOTE ADULT - ASSESSMENT
a/p 32 yo F  with 2.9cm probable hemorrhagic cyst,  early pregnancy vs ectopic, hemodynamically stable  - discussed ultrasound finding.  Patient states pregnancy is desired. Patient to return in 2 days for repeat  hcg and evaluation  - early pregnancy vs ectopic precautions given. Patient to return to ED if any worsening vaginal bleeding, abd pain, shortness of breath or any other concerns  - patient verbalized. all questions and concerns addressed  d/w DR Espana  d/w DR Roberts, [place] attending

## 2022-03-10 NOTE — ED PROVIDER NOTE - NSFOLLOWUPINSTRUCTIONS_ED_ALL_ED_FT
Follow up in the ER in 2 days for re-evaluation.    ***If you experience any new or worsening symptoms or if you are concerned you can always come back to the emergency for a re-evaluation.

## 2022-03-10 NOTE — ED PROVIDER NOTE - OBJECTIVE STATEMENT
Czech : Gerson, [id number]  31 year old female with LMP:  and a PHMx of PCOS who is  presents to the ED with complaints of vaginal spotting and pelvic pain. Patient reports 6 days ago, she had a gradual onset of vaginal spotting. Reports it is not triggered after intercourse. States the spotting lasted 3 days, stopped for 3 days, and then restarted for the last 3 days with mild pelvic cramping. Denies other complaints.  NKDA.

## 2022-03-10 NOTE — CONSULT NOTE ADULT - SUBJECTIVE AND OBJECTIVE BOX
31y  F  LMP  presents  to [facility] ED as instructed by Dr Espana to r/o ectopic preganancy. Patient admit to positive UPT at home which was later confirmed at Dr office.  Patient states ultrasound in office did not reveal intrauterine pregnancy. Patient admits to spotting when wiping and nausea. denies heavy vaginal bleeding,  vaginal discharge; pelvic pain, abd pain, cp, sob, palpitations, v/d/c, fever, chills or any other concerns. Patients states pregnancy is desired.     pob/gynhx:  ; followed by DR Velazquez , OBGyn.   PCOS on metformin. hx of ovarian cyst. Denies stds,  abn pap smears, or  fibroids. sexually active, once partner  pmhx: anxiety   pshx: denies  all: denies   meds: quetiapine  sochx:  denies toxic haibts    REVIEW OF SYSTEMS: see HPI	    PE:  Vital Signs Last 24 Hrs  T(C): 36.8 (10 Mar 2022 13:58), Max: 36.8 (10 Mar 2022 13:58)  T(F): 98.3 (10 Mar 2022 13:58), Max: 98.3 (10 Mar 2022 13:58)  HR: 88 (10 Mar 2022 13:58) (88 - 88)  BP: 118/73 (10 Mar 2022 13:58) (118/73 - 118/73)  BP(mean): --  RR: 18 (10 Mar 2022 13:58) (18 - 18)  SpO2: 100% (10 Mar 2022 13:58) (100% - 100%)    gen: nad  abd: +bs; soft, nt, nd, no rebound or guarding; no cvat b/l  pelvis: no cmt; no vaginal bleeding or abnormal discharge; no odor, closed/long, no uterine tenderness; uterus regular in contour, mobile. No adnexal tenderness or masses appreciated b/l     LABS:                        13.0   9.72  )-----------( 285      ( 10 Mar 2022 14:25 )             38.9     03-10    136  |  106  |  11  ----------------------------<  86  3.8   |  25  |  0.63    Ca    9.3      10 Mar 2022 14:25    TPro  7.8  /  Alb  3.7  /  TBili  0.3  /  DBili  x   /  AST  14  /  ALT  19  /  AlkPhos  44  03-10      Urinalysis Basic - ( 10 Mar 2022 14:25 )    Color: Yellow / Appearance: Clear / S.010 / pH: x  Gluc: x / Ketone: Negative  / Bili: Negative / Urobili: Negative   Blood: x / Protein: Negative / Nitrite: Negative   Leuk Esterase: Negative / RBC: 0-2 /HPF / WBC 0-2 /HPF   Sq Epi: x / Non Sq Epi: Occasional /HPF / Bacteria: Trace /HPF    ABO RH Interpretation: A POS: 03/10/2022 15:10  [facility]  Attention: Second specimen is required for ABORH Confirmation. (03.10.22 @ 14:37)    HCG Quantitative, Serum: 88: HCG-Quantitative test interpretations: This test is intended only for the  detection & monitoring of pregnancy. For oncology studies order the tumor  marker test “HCG-TM” (hCG-Tumor Marker).  For pregnancy evaluation the reference values are as follows:  Negative:  <=4 mIU/mL  Indeterminate:  5 - 25 mIU/mL (suggest repeat testing in 72 hours)  Positive:  > 25 mIU/mL  Reference Values during Pregnancy:  Weeks after LMP* REFERENCE INTERVAL mIU/mL     3      6 - 71     4      10 - 750     5      220 - 7,100     6      160 - 32,000     7      3,700 - 164,000     8      32,000 - 150,000     9      64,000 - 151,000     10      47,000 - 187,000     12      28,000 - 211,000     14      14,000 - 63,000     15      12,000 - 71,000     16 - 18  8,000 - 58,000  * LMP:  Last Menstrual Period  HCG results of false positive and false negative for pregnancy are rare,  but can occur with this, and other, hCG tests. Fiona- and post-menopausal  females may have mildly elevated hCG concentrations usually less than 14  mIU/mL that are constant over time. mIU/mL (03.10.22 @ 14:25)      RADIOLOGY & ADDITIONAL STUDIES:  sono  < from: US Transvaginal, OB (03.10.22 @ 15:54) >  PROCEDURE DATE:  03/10/2022    INTERPRETATION:  CLINICAL INFORMATION: Vaginal bleeding and abdominal   pain.  Pregnant.    LMP: 2022    Estimated Gestational Age by LMP: One week, 6 days.    COMPARISON: None available.    Endovaginal and transabdominal pelvic sonogram. Color and Spectral   Doppler was performed.    FINDINGS:  Uterus: 5.7 x 2.2 x2.3 cm.  Endometrium: 3 mm. Normal thickness. No evidence of intrauterine   pregnancy.  Right ovary: 4.4 cm x 4.6 cm x 3.3 cm. 2.9 x 2.7 x 2.9 cm complex cyst   with low-level echoes most compatible with hemorrhage. There is an   echogenic focus within it measuring 2.0 cm likely a blood clot. Normal   arterial and venous waveforms.  Left ovary: 1.9 cm x 3.0 cm x 2.0 cm. Within normal limits. Normal   arterial and venous waveforms.    Fluid: Small amount of fluid within the right adnexa.    IMPRESSION:  No evidence of intrauterine pregnancy. Differential diagnosis includes   early normal intrauterine pregnancy, abnormal intrauterine pregnancy with   ectopic pregnancy not excluded. Recommend correlation with beta hCG and   follow-up ultrasound.    2.9 cm probable hemorrhagic right ovarian cyst with retracted blood clot.   This may also be evaluated at time of follow-up ultrasound.    < end of copied text >      a/p 30 yo F  with 2.9 probable hemorrhagic cyst,  early pregnancy vs ectopic, hemodynamically stable  - discussed ultrasound finding.  Patient states pregnancy is desired. Patient to return in 2 days for repeat  hcg and evaluation  - early pregnancy vs ectopic precautions given. Patient to return to ED if any worsening vaginal bleeding, abd pain, shortness of breath or any other concerns  - patient verbalized. all questions and concerns addressed  d/w DR Espana  d/w DR Roberts [place] attending  -d/w  31y  F  LMP  presents  to [facility] ED as instructed by Dr Espana to r/o ectopic preganancy. Patient admits to a positive UPT at home which was later confirmed at Dr office.  Patient states ultrasound in office did not reveal intrauterine pregnancy. Patient admits to spotting when wiping and nausea. denies heavy vaginal bleeding,  vaginal discharge; pelvic pain, abd pain, cp, sob, palpitations, v/d/c, fever, chills or any other concerns. Patients states pregnancy is desired.     pob/gynhx:  ; followed by DR Velazquez , OBGyn.   PCOS on metformin. hx of ovarian cyst. Denies stds,  abn pap smears, or  fibroids. sexually active, one partner  pmhx: anxiety   pshx: denies  all: denies   meds: quetiapine, omeprazole  sochx:  denies toxic haibts    REVIEW OF SYSTEMS: see HPI	    PE:  Vital Signs Last 24 Hrs  T(C): 36.8 (10 Mar 2022 13:58), Max: 36.8 (10 Mar 2022 13:58)  T(F): 98.3 (10 Mar 2022 13:58), Max: 98.3 (10 Mar 2022 13:58)  HR: 88 (10 Mar 2022 13:58) (88 - 88)  BP: 118/73 (10 Mar 2022 13:58) (118/73 - 118/73)  BP(mean): --  RR: 18 (10 Mar 2022 13:58) (18 - 18)  SpO2: 100% (10 Mar 2022 13:58) (100% - 100%)    gen: nad, well appearing  abd: +bs; soft, nt, nd, no rebound or guarding; no cvat b/l  pelvis: no cmt; no vaginal bleeding or abnormal discharge; no odor, closed/long, no uterine tenderness; uterus regular in contour, mobile. No adnexal tenderness or masses appreciated b/l     LABS:                        13.0   9.72  )-----------( 285      ( 10 Mar 2022 14:25 )             38.9     03-10    136  |  106  |  11  ----------------------------<  86  3.8   |  25  |  0.63    Ca    9.3      10 Mar 2022 14:25    TPro  7.8  /  Alb  3.7  /  TBili  0.3  /  DBili  x   /  AST  14  /  ALT  19  /  AlkPhos  44  03-10      Urinalysis Basic - ( 10 Mar 2022 14:25 )    Color: Yellow / Appearance: Clear / S.010 / pH: x  Gluc: x / Ketone: Negative  / Bili: Negative / Urobili: Negative   Blood: x / Protein: Negative / Nitrite: Negative   Leuk Esterase: Negative / RBC: 0-2 /HPF / WBC 0-2 /HPF   Sq Epi: x / Non Sq Epi: Occasional /HPF / Bacteria: Trace /HPF    ABO RH Interpretation: A POS: 03/10/2022 15:10  QAS[facility]  Attention: Second specimen is required for ABORH Confirmation. (03.10.22 @ 14:37)    HCG Quantitative, Serum: 88: HCG-Quantitative test interpretations: This test is intended only for the  detection & monitoring of pregnancy. For oncology studies order the tumor  marker test “HCG-TM” (hCG-Tumor Marker).  For pregnancy evaluation the reference values are as follows:  Negative:  <=4 mIU/mL  Indeterminate:  5 - 25 mIU/mL (suggest repeat testing in 72 hours)  Positive:  > 25 mIU/mL  Reference Values during Pregnancy:  Weeks after LMP* REFERENCE INTERVAL mIU/mL     3      6 - 71     4      10 - 750     5      220 - 7,100     6      160 - 32,000     7      3,700 - 164,000     8      32,000 - 150,000     9      64,000 - 151,000     10      47,000 - 187,000     12      28,000 - 211,000     14      14,000 - 63,000     15      12,000 - 71,000     16 - 18  8,000 - 58,000  * LMP:  Last Menstrual Period  HCG results of false positive and false negative for pregnancy are rare,  but can occur with this, and other, hCG tests. Fiona- and post-menopausal  females may have mildly elevated hCG concentrations usually less than 14  mIU/mL that are constant over time. mIU/mL (03.10.22 @ 14:25)      RADIOLOGY & ADDITIONAL STUDIES:  sono  < from: US Transvaginal, OB (03.10.22 @ 15:54) >  PROCEDURE DATE:  03/10/2022    INTERPRETATION:  CLINICAL INFORMATION: Vaginal bleeding and abdominal   pain.  Pregnant.    LMP: 2022    Estimated Gestational Age by LMP: One week, 6 days.    COMPARISON: None available.    Endovaginal and transabdominal pelvic sonogram. Color and Spectral   Doppler was performed.    FINDINGS:  Uterus: 5.7 x 2.2 x2.3 cm.  Endometrium: 3 mm. Normal thickness. No evidence of intrauterine   pregnancy.  Right ovary: 4.4 cm x 4.6 cm x 3.3 cm. 2.9 x 2.7 x 2.9 cm complex cyst   with low-level echoes most compatible with hemorrhage. There is an   echogenic focus within it measuring 2.0 cm likely a blood clot. Normal   arterial and venous waveforms.  Left ovary: 1.9 cm x 3.0 cm x 2.0 cm. Within normal limits. Normal   arterial and venous waveforms.    Fluid: Small amount of fluid within the right adnexa.    IMPRESSION:  No evidence of intrauterine pregnancy. Differential diagnosis includes   early normal intrauterine pregnancy, abnormal intrauterine pregnancy with   ectopic pregnancy not excluded. Recommend correlation with beta hCG and   follow-up ultrasound.    2.9 cm probable hemorrhagic right ovarian cyst with retracted blood clot.   This may also be evaluated at time of follow-up ultrasound.    < end of copied text >

## 2022-03-10 NOTE — ED ADULT NURSE NOTE - CHIEF COMPLAINT QUOTE
Subjective:      Dirk Saldana is a 4 m.o. male here with mother and father. Patient brought in for Well Child (4 month check up)      History of Present Illness:  Last WCC at 2 mo with Dr. Jones.   - Diagnosed with right OM yesterday. Started on Amoxicillin.  - Reflux - on Nutramigen and Pepcid  - Hydrocele - seems to be resolving. Following with Urology in April 2020.    Concerns: None    Well Child Exam  Diet - WNL - Diet includes formula and solids (Nutramigen 4-6oz x 5-7 bottles. Started oatmeal and rice cereal)   Growth, Elimination, Sleep - WNL - Growth chart normal, voiding normal and stooling normal (Struggling to sleep through night since getting sick about 2 months with RSV. Does not want to be put down to sleep)  Physical Activity - WNL - active play time  Behavior - WNL -  Development - WNL -Developmental screen  School - normal -  Household/Safety - WNL - appropriate carseat/belt use, adult support for patient and safe environment      Well Child Development 1/8/2020   Reach for a dangling toy while lying on his or her back? Yes   Grab at clothes and reach for objects while on your lap? Yes   Look at a toy you put in his or her hand? Yes   Brings hands together? Yes   Keep his or her head steady when sitting up on your lap? Yes   Put hands or  a toy in his or her mouth? Yes   Push his or her head up when lying on the tummy for 15 seconds? Yes   Babble? Yes   Laugh? Yes   Make high pitched squeals? Yes   Make sounds when looking at toys or people? Yes   Calm on his or her own? Yes   Like to cuddle? Yes   Let you know when he or she likes or does not like something? Yes   Get excited when he or she sees you? Yes   Rash? No   [facility] PEQ MCHAT SCORE Incomplete   Some recent data might be hidden         Review of Systems   Constitutional: Negative for activity change, appetite change, crying, decreased responsiveness, fever and irritability.   HENT: Negative for congestion, drooling, ear  "discharge, mouth sores, rhinorrhea and trouble swallowing.    Eyes: Negative for discharge and redness.   Respiratory: Negative for apnea, cough, choking, wheezing and stridor.    Cardiovascular: Negative for leg swelling, fatigue with feeds, sweating with feeds and cyanosis.   Gastrointestinal: Negative for abdominal distention, blood in stool, constipation, diarrhea and vomiting.   Genitourinary: Negative for decreased urine volume, hematuria, penile swelling and scrotal swelling.   Musculoskeletal: Negative for extremity weakness and joint swelling.   Skin: Negative for color change, pallor, rash and wound.   Allergic/Immunologic: Negative for food allergies.   Neurological: Negative for seizures.   Hematological: Negative for adenopathy. Does not bruise/bleed easily.       Objective:   Temp 97.6 °F (36.4 °C) (Axillary)   Ht 2' 1.79" (0.655 m)   Wt 7.39 kg (16 lb 4.7 oz)   HC 43.1 cm (16.97")   BMI 17.23 kg/m²     Physical Exam   Constitutional: He appears well-developed and well-nourished. He is active.   HENT:   Head: Anterior fontanelle is flat.   Right Ear: Tympanic membrane is erythematous and bulging. A middle ear effusion is present.   Left Ear: Tympanic membrane is erythematous.   Nose: Nose normal.   Mouth/Throat: Mucous membranes are moist. Oropharynx is clear.   Eyes: Pupils are equal, round, and reactive to light. Conjunctivae and EOM are normal. Right eye exhibits no discharge. Left eye exhibits no discharge.   Neck: Normal range of motion.   Cardiovascular: Normal rate, regular rhythm, S1 normal and S2 normal.   No murmur heard.  Pulmonary/Chest: Effort normal and breath sounds normal. No respiratory distress.   Abdominal: Soft. Bowel sounds are normal. He exhibits no distension. There is no hepatosplenomegaly. There is no tenderness.   Genitourinary: Rectum normal and penis normal.   Genitourinary Comments: Testicles palpated bilaterally. Hydrocele present   Musculoskeletal: Normal range of " motion.   No hip clicks or clunks appreciated   Lymphadenopathy:     He has no cervical adenopathy.   Neurological: He is alert.   Skin: Skin is warm and dry. No rash noted.   Vitals reviewed.      Assessment:     1. Encounter for routine child health examination without abnormal findings    2. Gastroesophageal reflux disease, esophagitis presence not specified    3. Non-recurrent acute suppurative otitis media of right ear without spontaneous rupture of tympanic membrane    4. Hydrocele in infant        Plan:     Dirk was seen today for well child.    Diagnoses and all orders for this visit:    Encounter for routine child health examination without abnormal findings  -     DTaP HiB IPV combined vaccine IM (PENTACEL)  -     Pneumococcal conjugate vaccine 13-valent less than 6yo IM  -     Rotavirus vaccine pentavalent 3 dose oral    Gastroesophageal reflux disease, esophagitis presence not specified  Continue Nutramigen and Pepcid. No need for increase in dose today.    Non-recurrent acute suppurative otitis media of right ear without spontaneous rupture of tympanic membrane  Continue Amoxicillin as prescribed yesterday.    Hydrocele in infant  Due for follow up with Urology in April 2020.        Anticipatory guidance handout provided and reviewed SIDS risks, Infant car seat, Never shake baby, Don't leave unattended in tub/high places, Fever criteria, When to call, start solids: rice cereal first then fruits and veggies, wait 4-5 days when adding new food into diet, no need for water or juice, teething, Bedtime routine- put to bed awake, Attention to other siblings, Encouraged talking/singing/reading   Follow up for 6mo well check       c/o vaginal bleeding x 1 week reports + home pregnancy last night

## 2022-03-10 NOTE — ED PROVIDER NOTE - PROGRESS NOTE DETAILS
Results reviewed. Will consult OB for eval. Evaluated by OB. Plan: dc with follow up in the ER in 2 days for re-eval. Pt is well appearing walking with steady gait, stable for discharge and follow up without fail with medical doctor. I had a detailed discussion with the patient and/or guardian regarding the historical points, exam findings, and any diagnostic results supporting the discharge diagnosis. Pt educated on care and need for follow up. Strict return instructions and red flag signs and symptoms discussed with patient. Questions answered. Pt shows understanding of discharge information and agrees to follow.

## 2022-03-10 NOTE — ED PROVIDER NOTE - ATTENDING CONTRIBUTION TO CARE
I performed the initial face to face bedside interview with this patient regarding history of present illness, review of symptoms and past medical, social and family history.  I completed an independent physical examination.  I was the initial provider who evaluated this patient.  The history, review of symptoms and examination was documented by the scribe in my presence and I attest to the accuracy of the documentation.  I have signed out the follow up of any pending tests (i.e. labs, radiological studies) to the PA/NP.  I have discussed the patient’s plan of care and disposition with the PA/NP.     well appearing female, no acute distress, normal work of breathing. seen by OB.

## 2022-03-10 NOTE — ED PROVIDER NOTE - PATIENT PORTAL LINK FT
You can access the FollowMyHealth Patient Portal offered by [facility] by registering at the following website: [email or web address]. By joining [employer]’s FollowMyHealth portal, you will also be able to view your health information using other applications (apps) compatible with our system.

## 2022-03-11 LAB
CULTURE RESULTS: SIGNIFICANT CHANGE UP
SPECIMEN SOURCE: SIGNIFICANT CHANGE UP

## 2022-03-13 ENCOUNTER — EMERGENCY (EMERGENCY)
Facility: HOSPITAL | Age: 32
LOS: 1 days | Discharge: HOME | End: 2022-03-13
Attending: EMERGENCY MEDICINE
Payer: MEDICAID

## 2022-03-13 VITALS
OXYGEN SATURATION: 99 % | HEART RATE: 75 BPM | DIASTOLIC BLOOD PRESSURE: 62 MMHG | SYSTOLIC BLOOD PRESSURE: 115 MMHG | TEMPERATURE: 98 F | WEIGHT: 171.96 LBS | HEIGHT: 67 IN | RESPIRATION RATE: 20 BRPM

## 2022-03-13 DIAGNOSIS — O03.9 COMPLETE OR UNSPECIFIED SPONTANEOUS ABORTION WITHOUT COMPLICATION: ICD-10-CM

## 2022-03-13 PROBLEM — E28.2 POLYCYSTIC OVARIAN SYNDROME: Chronic | Status: ACTIVE | Noted: 2022-03-10

## 2022-03-13 LAB — HCG SERPL-ACNC: 13 MIU/ML — HIGH

## 2022-03-13 PROCEDURE — 99285 EMERGENCY DEPT VISIT HI MDM: CPT

## 2022-03-13 PROCEDURE — 36415 COLL VENOUS BLD VENIPUNCTURE: CPT

## 2022-03-13 PROCEDURE — 76801 OB US < 14 WKS SINGLE FETUS: CPT | Mod: 26

## 2022-03-13 PROCEDURE — 99284 EMERGENCY DEPT VISIT MOD MDM: CPT | Mod: 25

## 2022-03-13 PROCEDURE — 76801 OB US < 14 WKS SINGLE FETUS: CPT

## 2022-03-13 PROCEDURE — 84702 CHORIONIC GONADOTROPIN TEST: CPT

## 2022-03-13 PROCEDURE — 76817 TRANSVAGINAL US OBSTETRIC: CPT

## 2022-03-13 PROCEDURE — 76817 TRANSVAGINAL US OBSTETRIC: CPT | Mod: 26

## 2022-03-13 RX ORDER — ONDANSETRON 8 MG/1
4 TABLET, FILM COATED ORAL ONCE
Refills: 0 | Status: COMPLETED | OUTPATIENT
Start: 2022-03-13 | End: 2022-03-13

## 2022-03-13 RX ADMIN — ONDANSETRON 4 MILLIGRAM(S): 8 TABLET, FILM COATED ORAL at 16:26

## 2022-03-13 NOTE — ED ADULT NURSE NOTE - NS ED NOTE  TALK SOMEONE YN
[de-identified] : 69M here in followup.\par \par Since last seen four weeks prior, he has no new complaints. The lesion on his tongue has remained, unchanged - no bigger or smaller.\par Initially seen four weeks ago for a bump on his tongue for two weeks with surrounding yellow/brownish dots. At that time, he started peroxide rinses on his own and the discoloration improved. Since then, the bump has also gotten a bit smaller. \par \par There remains no pain and no bleeding, no tongue weakness or altered sensation and otherwise no other associated sx.\par \par No recent trauma.\par Nonsmoker, no etoh. \par Not on any steroid inhalers.\par \par ROS otherwise unremarkable.
No

## 2022-03-13 NOTE — ED ADULT NURSE NOTE - NSHOSCREENINGQ1_ED_ALL_ED
Patient comes to clinic for follow up anticoagulation visit.   Last INR 1/2/20 was 2.6.  Dose maintained per protocol.   Today's INR is 2.0 and is within goal range.    Current warfarin dosing verified with patient. Patient was informed that their INR result is within therapeutic range and instructed to maintain current dose per protocol. Discussed dose and return date for next INR.    Dr. Myesr is in the office today supervising the treatment.    Patient was instructed to contact the clinic with any unusual bleeding or bruising, any changes in medications, diet, health status, lifestyle, or any other changes, questions or concerns. Patient verbalized understanding of all discussed.   
No

## 2022-03-13 NOTE — ED PROVIDER NOTE - PROGRESS NOTE DETAILS
OB consulted and results reviewed. As per OB presentation most likely consistent with miscarriage. Will need to follow up with Dr Espana in 48 hours. Pt is well appearing walking with steady gait, stable for discharge and follow up without fail with medical doctor. I had a detailed discussion with the patient and/or guardian regarding the historical points, exam findings, and any diagnostic results supporting the discharge diagnosis. Pt educated on care and need for follow up. Strict return instructions and red flag signs and symptoms discussed with patient. Questions answered. Pt shows understanding of discharge information and agrees to follow.

## 2022-03-13 NOTE — ED ADULT NURSE NOTE - NSIMPLEMENTINTERV_GEN_ALL_ED
Implemented All Universal Safety Interventions:  Maury to call system. Call bell, personal items and telephone within reach. Instruct patient to call for assistance. Room bathroom lighting operational. Non-slip footwear when patient is off stretcher. Physically safe environment: no spills, clutter or unnecessary equipment. Stretcher in lowest position, wheels locked, appropriate side rails in place.

## 2022-03-13 NOTE — ED PROVIDER NOTE - PATIENT PORTAL LINK FT
You can access the FollowMyHealth Patient Portal offered by [facility] by registering at the following website: [email or web address]. By joining [employer]’s FollowMyHealth portal, you will also be able to view your health information using other applications (apps) compatible with our system.

## 2022-03-13 NOTE — CONSULT NOTE ADULT - SUBJECTIVE AND OBJECTIVE BOX
· Subjective and Objective:   31y  F  LMP  returns  to [facility] ED as instructed by Dr Espana to r/o ectopic preganancy. Patient admits to a positive UPT at home which was later confirmed at Dr office.  Patient states ultrasound in office did not reveal intrauterine pregnancy. Patient admits to spotting resolved. denies heavy vaginal bleeding,  vaginal discharge; pelvic pain, abd pain, cp, sob, palpitations, v/d/c, fever, chills or any other concerns. Patients states pregnancy is desired.   [facility] 88-->13  pob/gynhx:  ; followed by DR Velazquez , OBGyn.   PCOS on metformin. hx of ovarian cyst. Denies stds,  abn pap smears, or  fibroids. sexually active, one partner  pmhx: anxiety   pshx: denies  all: denies   meds: quetiapine, omeprazole  sochx:  denies toxic haibts       REVIEW OF SYSTEMS: see HPI	    PE:  Vital Signs Last 24 Hrs  T(C): 36.7 (13 Mar 2022 14:28), Max: 36.7 (13 Mar 2022 14:28)  T(F): 98.1 (13 Mar 2022 14:28), Max: 98.1 (13 Mar 2022 14:28)  HR: 75 (13 Mar 2022 14:28) (75 - 75)  BP: 115/62 (13 Mar 2022 14:28) (115/62 - 115/62)  BP(mean): --  RR: 20 (13 Mar 2022 14:28) (20 - 20)  SpO2: 99% (13 Mar 2022 14:28) (99% - 99%)  abd: +bs; soft, nt, nd, no rebound or guarding; no cvat b/l  pelvis: no cmt; no vaginal bleeding or abnormal discharge; no odor, closed/long, no uterine tenderness; uterus approx 8wks size regular in contour, mobile. No adnexal tenderness or masses appreciated b/l     LABS:        < from: US Transvaginal, OB (22 @ 16:00) >  IMPRESSION:    Redemonstration of a 3.1 cm sized complex right adnexal lesion that may   represent ectopic pregnancy or hemorrhagic cyst.    No intrauterine gestational sac.      < end of copied text >              -d/w

## 2022-03-13 NOTE — ED PROVIDER NOTE - CARE PROVIDER_API CALL
Laurel Espana  OBSTETRICS AND GYNECOLOGY  [address], [address]  [place] [address]  Phone: [phone]  Fax: [phone]  Follow Up Time:

## 2022-03-13 NOTE — ED PROVIDER NOTE - ATTENDING CONTRIBUTION TO CARE
I was physically present for the E/M service provided. I agree with above history, physical, and plan which I have reviewed and edited where appropriate. I was physically present for the key portions of the service provided.    Raul: 31 year old  female with PMHx of migraines, depression, and PCOS presenting to the ED for a follow up. Patient was seen in this ED two days ago for vaginal bleeding and mild pelvic pain, and was found to have an hCG level of 88 at the time with a right ovarian cyst shown on ultrasound. pt feeling nausea. no bleeding or pain.    well appearing, abd s/nt/nd    a/p: here for hcg check and r/o ectopic vs ovarian cyst. ob eval.

## 2022-03-13 NOTE — ED ADULT TRIAGE NOTE - SOURCE OF INFORMATION
Patient/Spouse

## 2022-03-13 NOTE — ED PROVIDER NOTE - NSFOLLOWUPINSTRUCTIONS_ED_ALL_ED_FT
Follow up with Dr Espana in 2 days.    If you experience any new or worsening symptoms or if you are concerned you can always come back to the emergency for a re-evaluation.

## 2022-03-13 NOTE — ED PROVIDER NOTE - OBJECTIVE STATEMENT
31 year old  female with PMHx of migraines, depression, and PCOS presenting to the ED for a follow up. Patient was seen in this ED two days ago for vaginal bleeding and mild pelvic pain, and was found to have an hCG level of 88 at the time with a right ovarian cyst shown on ultrasound. Patient reports that her vaginal bleeding has since stopped, and the pelvic pain has significantly improved since then. Patient is currently noting some nausea and bilateral breast tenderness, however denies all other complaints including any dizziness. ELIDIADA.

## 2022-03-13 NOTE — CONSULT NOTE ADULT - ASSESSMENT
a/p s/p complete ab  follow up in office 48hours in office  ectopic precations  d/w dr.munarova nagel notified  ivan

## 2023-07-19 NOTE — ED ADULT NURSE NOTE - CAS ELECT INFOMATION PROVIDED
Refill Decision Note      Refill Decision Note   Eduard Perez  is requesting a refill authorization.  Brief Assessment and Rationale for Refill:  Approve     Medication Therapy Plan:         Comments:     Note composed:7:03 AM 07/19/2023             Appointments     Last Visit   5/8/2023 Sylvester Molina MD   Next Visit   8/7/2023 Sylvester Molina MD       DC instructions

## 2024-04-02 ENCOUNTER — APPOINTMENT (OUTPATIENT)
Dept: ANTEPARTUM | Facility: CLINIC | Age: 34
End: 2024-04-02

## 2024-05-16 ENCOUNTER — EMERGENCY (EMERGENCY)
Facility: HOSPITAL | Age: 34
LOS: 1 days | Discharge: HOME | End: 2024-05-16
Attending: EMERGENCY MEDICINE | Admitting: EMERGENCY MEDICINE
Payer: COMMERCIAL

## 2024-05-16 VITALS
OXYGEN SATURATION: 99 % | RESPIRATION RATE: 16 BRPM | TEMPERATURE: 98 F | HEART RATE: 88 BPM | SYSTOLIC BLOOD PRESSURE: 109 MMHG | DIASTOLIC BLOOD PRESSURE: 73 MMHG

## 2024-05-16 LAB
ALBUMIN SERPL ELPH-MCNC: 3.3 G/DL — SIGNIFICANT CHANGE UP (ref 3.3–5)
ALP SERPL-CCNC: 72 U/L — SIGNIFICANT CHANGE UP (ref 40–120)
ALT FLD-CCNC: 10 U/L — SIGNIFICANT CHANGE UP (ref 4–33)
ANION GAP SERPL CALC-SCNC: 10 MMOL/L — SIGNIFICANT CHANGE UP (ref 7–14)
APPEARANCE UR: CLEAR — SIGNIFICANT CHANGE UP
APTT BLD: 26.8 SEC — SIGNIFICANT CHANGE UP (ref 24.5–35.6)
AST SERPL-CCNC: 13 U/L — SIGNIFICANT CHANGE UP (ref 4–32)
BACTERIA # UR AUTO: ABNORMAL /HPF
BASOPHILS # BLD AUTO: 0.03 K/UL — SIGNIFICANT CHANGE UP (ref 0–0.2)
BASOPHILS NFR BLD AUTO: 0.3 % — SIGNIFICANT CHANGE UP (ref 0–2)
BILIRUB SERPL-MCNC: <0.2 MG/DL — SIGNIFICANT CHANGE UP (ref 0.2–1.2)
BILIRUB UR-MCNC: NEGATIVE — SIGNIFICANT CHANGE UP
BUN SERPL-MCNC: 5 MG/DL — LOW (ref 7–23)
CALCIUM SERPL-MCNC: 8.9 MG/DL — SIGNIFICANT CHANGE UP (ref 8.4–10.5)
CAST: 1 /LPF — SIGNIFICANT CHANGE UP (ref 0–4)
CHLORIDE SERPL-SCNC: 105 MMOL/L — SIGNIFICANT CHANGE UP (ref 98–107)
CO2 SERPL-SCNC: 22 MMOL/L — SIGNIFICANT CHANGE UP (ref 22–31)
COLOR SPEC: YELLOW — SIGNIFICANT CHANGE UP
CREAT SERPL-MCNC: 0.55 MG/DL — SIGNIFICANT CHANGE UP (ref 0.5–1.3)
DIFF PNL FLD: NEGATIVE — SIGNIFICANT CHANGE UP
EGFR: 123 ML/MIN/1.73M2 — SIGNIFICANT CHANGE UP
EOSINOPHIL # BLD AUTO: 0.11 K/UL — SIGNIFICANT CHANGE UP (ref 0–0.5)
EOSINOPHIL NFR BLD AUTO: 1.2 % — SIGNIFICANT CHANGE UP (ref 0–6)
GLUCOSE SERPL-MCNC: 46 MG/DL — CRITICAL LOW (ref 70–99)
GLUCOSE UR QL: 500 MG/DL
HCT VFR BLD CALC: 36.2 % — SIGNIFICANT CHANGE UP (ref 34.5–45)
HGB BLD-MCNC: 12.8 G/DL — SIGNIFICANT CHANGE UP (ref 11.5–15.5)
IANC: 7.1 K/UL — SIGNIFICANT CHANGE UP (ref 1.8–7.4)
IMM GRANULOCYTES NFR BLD AUTO: 0.3 % — SIGNIFICANT CHANGE UP (ref 0–0.9)
INR BLD: 0.94 RATIO — SIGNIFICANT CHANGE UP (ref 0.85–1.18)
KETONES UR-MCNC: NEGATIVE MG/DL — SIGNIFICANT CHANGE UP
LEUKOCYTE ESTERASE UR-ACNC: NEGATIVE — SIGNIFICANT CHANGE UP
LYMPHOCYTES # BLD AUTO: 1.59 K/UL — SIGNIFICANT CHANGE UP (ref 1–3.3)
LYMPHOCYTES # BLD AUTO: 16.6 % — SIGNIFICANT CHANGE UP (ref 13–44)
MCHC RBC-ENTMCNC: 33.2 PG — SIGNIFICANT CHANGE UP (ref 27–34)
MCHC RBC-ENTMCNC: 35.4 GM/DL — SIGNIFICANT CHANGE UP (ref 32–36)
MCV RBC AUTO: 94 FL — SIGNIFICANT CHANGE UP (ref 80–100)
MONOCYTES # BLD AUTO: 0.69 K/UL — SIGNIFICANT CHANGE UP (ref 0–0.9)
MONOCYTES NFR BLD AUTO: 7.2 % — SIGNIFICANT CHANGE UP (ref 2–14)
NEUTROPHILS # BLD AUTO: 7.1 K/UL — SIGNIFICANT CHANGE UP (ref 1.8–7.4)
NEUTROPHILS NFR BLD AUTO: 74.4 % — SIGNIFICANT CHANGE UP (ref 43–77)
NITRITE UR-MCNC: NEGATIVE — SIGNIFICANT CHANGE UP
NRBC # BLD: 0 /100 WBCS — SIGNIFICANT CHANGE UP (ref 0–0)
NRBC # FLD: 0 K/UL — SIGNIFICANT CHANGE UP (ref 0–0)
PH UR: 6 — SIGNIFICANT CHANGE UP (ref 5–8)
PLATELET # BLD AUTO: 224 K/UL — SIGNIFICANT CHANGE UP (ref 150–400)
POTASSIUM SERPL-MCNC: 3.3 MMOL/L — LOW (ref 3.5–5.3)
POTASSIUM SERPL-SCNC: 3.3 MMOL/L — LOW (ref 3.5–5.3)
PROT SERPL-MCNC: 6.6 G/DL — SIGNIFICANT CHANGE UP (ref 6–8.3)
PROT UR-MCNC: NEGATIVE MG/DL — SIGNIFICANT CHANGE UP
PROTHROM AB SERPL-ACNC: 10.6 SEC — SIGNIFICANT CHANGE UP (ref 9.5–13)
RBC # BLD: 3.85 M/UL — SIGNIFICANT CHANGE UP (ref 3.8–5.2)
RBC # FLD: 12.3 % — SIGNIFICANT CHANGE UP (ref 10.3–14.5)
RBC CASTS # UR COMP ASSIST: 2 /HPF — SIGNIFICANT CHANGE UP (ref 0–4)
REVIEW: SIGNIFICANT CHANGE UP
SODIUM SERPL-SCNC: 137 MMOL/L — SIGNIFICANT CHANGE UP (ref 135–145)
SP GR SPEC: 1.01 — SIGNIFICANT CHANGE UP (ref 1–1.03)
SQUAMOUS # UR AUTO: 3 /HPF — SIGNIFICANT CHANGE UP (ref 0–5)
TROPONIN T, HIGH SENSITIVITY RESULT: <6 NG/L — SIGNIFICANT CHANGE UP
UROBILINOGEN FLD QL: 0.2 MG/DL — SIGNIFICANT CHANGE UP (ref 0.2–1)
WBC # BLD: 9.55 K/UL — SIGNIFICANT CHANGE UP (ref 3.8–10.5)
WBC # FLD AUTO: 9.55 K/UL — SIGNIFICANT CHANGE UP (ref 3.8–10.5)
WBC UR QL: 10 /HPF — HIGH (ref 0–5)

## 2024-05-16 PROCEDURE — 93010 ELECTROCARDIOGRAM REPORT: CPT

## 2024-05-16 PROCEDURE — 99285 EMERGENCY DEPT VISIT HI MDM: CPT

## 2024-05-16 RX ORDER — ONDANSETRON 8 MG/1
4 TABLET, FILM COATED ORAL ONCE
Refills: 0 | Status: COMPLETED | OUTPATIENT
Start: 2024-05-16 | End: 2024-05-16

## 2024-05-16 RX ORDER — SODIUM CHLORIDE 9 MG/ML
1000 INJECTION INTRAMUSCULAR; INTRAVENOUS; SUBCUTANEOUS ONCE
Refills: 0 | Status: COMPLETED | OUTPATIENT
Start: 2024-05-16 | End: 2024-05-16

## 2024-05-16 RX ADMIN — SODIUM CHLORIDE 1000 MILLILITER(S): 9 INJECTION INTRAMUSCULAR; INTRAVENOUS; SUBCUTANEOUS at 12:43

## 2024-05-16 NOTE — ED PROVIDER NOTE - NSFOLLOWUPINSTRUCTIONS_ED_ALL_ED_FT
You have been evaluated in the Emergency Department today for syncope. Your evaluation did not show evidence of medical conditions requiring emergent intervention at this time.    Please follow-up with your Ob/Gyn in 2-3 days.    Please make sure your are staying well hydrated.     Return to the Emergency Department if you experience another syncopal episode. chest pain, difficulty breathing, vaginal bleeding, abdominal pain, recurrent vomiting, or any other concerning symptoms.    Thank you for choosing us for your care.

## 2024-05-16 NOTE — ED ADULT NURSE NOTE - CHIEF COMPLAINT QUOTE
Pt 28 weeks pregnant  presents from MD office for episode of syncope while doing glucose testing. Pt denies any present headache/dizziness, no chest pain, breathing even and unlabored, denies n/v/d, afebrile. L&D contacted, pt to be evaluated in our ED.

## 2024-05-16 NOTE — ED PROVIDER NOTE - PHYSICAL EXAMINATION
PHYSICAL EXAM:  GENERAL: Sitting comfortable in bed, in no acute distress  HENMT: Atraumatic, moist mucous membranes  EYES: Clear bilaterally, PERRL, EOMs intact b/l  HEART: Regular rate and regular rhythm, S1/S2  RESPIRATORY: Clear to auscultation bilaterally, no wheezes/rhonchi/rales  ABDOMEN: Soft, nontender,gravid  MSK: No spinal or paraspinal ttp, no chest wall ttp, pelvis stable  EXTREMITIES: B/l upper and lower extremities with normal ROM and no ttp, no lower extremity edema  NEURO: Alert, follwos commands, moving all extremities symmetrically  SKIN: Skin normal color for race, warm, dry and intact

## 2024-05-16 NOTE — ED PROVIDER NOTE - ATTENDING CONTRIBUTION TO CARE
Agree with resident note  33-year-old G2, P0 at 29 weeks, past medical history of PCOS, migraines, depression, hypothyroidism presents after syncopal episode at OB/GYN's office.  Patient went for glucose tolerance test.  Lasted all night.  After having glucose load started feeling numbness, got diaphoretic, states lost consciousness for a few minutes.  States was able to hear voices.  Patient denies any abdominal trauma.  Patient feels baby kicking.  Patient has no complaints.  Physical exam  Gen: pt well appearing in no respiratory distress  vital signs stable  NCAT  Lungs: CTAB/L  Cardiac: s1 s2 no m/r/g  abdomen: soft/NT/gravid uterus  ext: no edema  Neuro: CNs intact 5/5 motor UE and LE; sensation intact; gait stable  skin: no rash  Impression  Patient obtaining NST now by OB/GYN, heart rate of 141 fetus  EKG normal sinus rhythm no ischemic changes  Will get baseline labs to ensure patient is not anemic  If NST is within normal limits and labs returned normal patient can be discharged

## 2024-05-16 NOTE — ED ADULT NURSE NOTE - OBJECTIVE STATEMENT
Patient received in stretcher. AOX4. Respirations even and unlabored. Spontaneous movement of all extremities noted. Presents to ER c/o " I passed out while getting my glucose testing done" patient reports fasting for >12 hours prior to blood work being drawn. Starting drinking glucose and immediately felt weak and passed out after. Denies abd pain, vaginal discharge or belly/ baby complaints. + LOC, denies hitting head or use of blood thinners.  Patient reports feeling normal baby movement. NST in progress. Comfort and safety maintained. All current care needs met. Care plan continued Suly SAINZ

## 2024-05-16 NOTE — ED PROVIDER NOTE - CLINICAL SUMMARY MEDICAL DECISION MAKING FREE TEXT BOX
33  at 29 weeks past medical history PCOS, migraines, anxiety and depression, hypothyroidism presenting status post syncope. DDx includes but not limited to: syncope 2/2 orthostatic, vasovagal, less likely cardiogenic. Will reach out to OB regarding NST. Plan: blood work, ECG, IVF, zofran. Will re-assess.

## 2024-05-16 NOTE — ED ADULT NURSE REASSESSMENT NOTE - NS ED NURSE REASSESS COMMENT FT1
Pt received to results waiting. Awake and alert, at baseline mental status. Respirations even and unlabored on room air. NAD noted. Pt offers no new complaints at this time. Safety maintained.

## 2024-05-16 NOTE — ED PROVIDER NOTE - PATIENT PORTAL LINK FT
You can access the FollowMyHealth Patient Portal offered by [facility] by registering at the following website: [email or web address]. By joining [employer]s FollowMyHealth portal, you will also be able to view your health information using other applications (apps) compatible with our system.

## 2024-05-16 NOTE — PROVIDER CONTACT NOTE (OTHER) - ACTION/TREATMENT ORDERED:
patient NST done, NST reactive MD [place] made aware patient NST done, NST reactive MD [place] approved patient to be off monitor , fetal heart tracing on patient physical chart

## 2024-05-16 NOTE — ED PROVIDER NOTE - PROGRESS NOTE DETAILS
Monet Cummings M.D. (Resident Physician): Ob said NST looks good. Rest of w/u non-actionable and pt feeling better. Will dc with ob f/u.

## 2024-05-16 NOTE — ED PROVIDER NOTE - OBJECTIVE STATEMENT
33  at 29 weeks past medical history PCOS, migraines, anxiety and depression, hypothyroidism presenting status post syncope.  Patient says that this morning she was having a glucose tolerance test.  Had not ate or drink anything since 9 PM yesterday.  Shortly after drinking the glucose she started having bilateral lower extremity numbness, felt hot, diaphoretic, shaking, heart racing, blurry vision and then she lost consciousness falling from the chair she was sitting into the ground.  Was told that she was unconscious for a couple minutes.  Currently patient feels nausea, generalized weakness, dizziness that she describes as an unsteady sensation.  Has felt the baby move since she fell.  Denies any headache, neck pain, chest pain, shortness of breath, abdominal pain, vaginal bleeding, vaginal cramping, leakage of fluid, extremity pain.  OB/GYN is Dr. Eckert.

## 2024-05-16 NOTE — ED ADULT TRIAGE NOTE - CHIEF COMPLAINT QUOTE
Pt 28 weeks pregnant  presents from MD office for episode of syncope while doing glucose testing. Pt denies any present headache/dizziness, no chest pain, breathing even and unlabored, denies n/v/d, afebrile. Pt 28 weeks pregnant  presents from MD office for episode of syncope while doing glucose testing. Pt denies any present headache/dizziness, no chest pain, breathing even and unlabored, denies n/v/d, afebrile. L&D contacted, pt to be evaluated in our ED.

## 2024-05-17 LAB
CULTURE RESULTS: SIGNIFICANT CHANGE UP
SPECIMEN SOURCE: SIGNIFICANT CHANGE UP

## 2024-07-11 ENCOUNTER — RESULT REVIEW (OUTPATIENT)
Age: 34
End: 2024-07-11